# Patient Record
Sex: FEMALE | URBAN - METROPOLITAN AREA
[De-identification: names, ages, dates, MRNs, and addresses within clinical notes are randomized per-mention and may not be internally consistent; named-entity substitution may affect disease eponyms.]

---

## 2021-06-25 ENCOUNTER — OFFICE VISIT (OUTPATIENT)
Dept: URBAN - METROPOLITAN AREA CLINIC 91 | Facility: CLINIC | Age: 76
End: 2021-06-25
Payer: COMMERCIAL

## 2021-06-25 DIAGNOSIS — H04.123 DRY EYE SYNDROME OF BILATERAL LACRIMAL GLANDS: ICD-10-CM

## 2021-06-25 DIAGNOSIS — H43.811 VITREOUS DEGENERATION, RIGHT EYE: ICD-10-CM

## 2021-06-25 DIAGNOSIS — H16.223 KERATOCONJUNCTIVITIS SICCA, NOT SPECIFIED AS SJÖGREN'S, BILATERAL: ICD-10-CM

## 2021-06-25 PROCEDURE — 99213 OFFICE O/P EST LOW 20 MIN: CPT | Performed by: OPHTHALMOLOGY

## 2021-06-25 PROCEDURE — 92134 CPTRZ OPH DX IMG PST SGM RTA: CPT | Performed by: OPHTHALMOLOGY

## 2021-06-25 ASSESSMENT — INTRAOCULAR PRESSURE
OS: 16
OD: 16

## 2021-06-25 ASSESSMENT — VISUAL ACUITY
OD: 20/20
OS: 20/20

## 2021-06-25 NOTE — IMPRESSION/PLAN
Impression: Hypertensive retinopathy, bilateral: H35.033. Plan: Evidence of hypertensive retinopathy found. The patient was advised to see primary care physician to keep blood pressure under good control. Risk of sudden vision loss associated with hypertension was discussed with patient. If vision suddenly changes, patient should notify our office immediately. Donald STEIN.

## 2021-12-15 ENCOUNTER — OFFICE VISIT (OUTPATIENT)
Dept: URBAN - METROPOLITAN AREA CLINIC 91 | Facility: CLINIC | Age: 76
End: 2021-12-15
Payer: MEDICARE

## 2021-12-15 DIAGNOSIS — H35.033 HYPERTENSIVE RETINOPATHY, BILATERAL: Primary | ICD-10-CM

## 2021-12-15 PROCEDURE — 99213 OFFICE O/P EST LOW 20 MIN: CPT | Performed by: OPHTHALMOLOGY

## 2021-12-15 RX ORDER — CYCLOSPORINE 0.5 MG/ML
0.05 % EMULSION OPHTHALMIC
Qty: 180 | Refills: 3 | Status: ACTIVE
Start: 2021-12-15

## 2021-12-15 ASSESSMENT — INTRAOCULAR PRESSURE
OS: 13
OD: 13

## 2021-12-15 NOTE — IMPRESSION/PLAN
Impression: Hypertensive retinopathy, bilateral: H35.033. Plan: Evidence of hypertensive retinopathy found. The patient was advised to see primary care physician to keep blood pressure under good control. Risk of sudden vision loss associated with hypertension was discussed with patient. If vision suddenly changes, patient should notify our office immediately. FFm obtained and Reviewed WNL.

## 2022-05-13 ENCOUNTER — OFFICE VISIT (OUTPATIENT)
Dept: URBAN - METROPOLITAN AREA CLINIC 91 | Facility: CLINIC | Age: 77
End: 2022-05-13
Payer: MEDICARE

## 2022-05-13 DIAGNOSIS — H35.033 HYPERTENSIVE RETINOPATHY, BILATERAL: Primary | ICD-10-CM

## 2022-05-13 DIAGNOSIS — H10.45 OTHER CHRONIC ALLERGIC CONJUNCTIVITIS: ICD-10-CM

## 2022-05-13 PROCEDURE — 92134 CPTRZ OPH DX IMG PST SGM RTA: CPT | Performed by: OPHTHALMOLOGY

## 2022-05-13 PROCEDURE — 99213 OFFICE O/P EST LOW 20 MIN: CPT | Performed by: OPHTHALMOLOGY

## 2022-05-13 RX ORDER — ALCAFTADINE 2.5 MG/ML
0.25 % SOLUTION/ DROPS OPHTHALMIC
Qty: 5 | Refills: 2 | Status: ACTIVE
Start: 2022-05-13

## 2022-05-13 ASSESSMENT — INTRAOCULAR PRESSURE
OS: 16
OD: 16

## 2022-05-13 NOTE — IMPRESSION/PLAN
Impression: Other chronic allergic conjunctivitis: H10.45. Plan: For allergic conjunctivitis, I have recommended patient avoid allergens and use cool compresses for comfort. I also discussed option giving patient Rx for a topical antihistamine eye drop. Start Lastacaft BID OU.

## 2022-11-18 ENCOUNTER — OFFICE VISIT (OUTPATIENT)
Dept: URBAN - METROPOLITAN AREA CLINIC 91 | Facility: CLINIC | Age: 77
End: 2022-11-18
Payer: MEDICARE

## 2022-11-18 DIAGNOSIS — H04.123 DRY EYE SYNDROME OF BILATERAL LACRIMAL GLANDS: Primary | ICD-10-CM

## 2022-11-18 PROCEDURE — 99213 OFFICE O/P EST LOW 20 MIN: CPT | Performed by: OPHTHALMOLOGY

## 2022-11-18 ASSESSMENT — INTRAOCULAR PRESSURE
OS: 16
OD: 16

## 2023-06-06 ENCOUNTER — OFFICE VISIT (OUTPATIENT)
Dept: URBAN - METROPOLITAN AREA CLINIC 91 | Facility: CLINIC | Age: 78
End: 2023-06-06
Payer: MEDICARE

## 2023-06-06 DIAGNOSIS — H35.033 HYPERTENSIVE RETINOPATHY, BILATERAL: Primary | ICD-10-CM

## 2023-06-06 DIAGNOSIS — E11.9 TYPE 2 DIABETES MELLITUS WITHOUT COMPLICATIONS: ICD-10-CM

## 2023-06-06 PROCEDURE — 92134 CPTRZ OPH DX IMG PST SGM RTA: CPT | Performed by: OPHTHALMOLOGY

## 2023-06-06 PROCEDURE — 99214 OFFICE O/P EST MOD 30 MIN: CPT | Performed by: OPHTHALMOLOGY

## 2023-06-06 ASSESSMENT — INTRAOCULAR PRESSURE
OD: 14
OS: 16

## 2023-06-06 NOTE — IMPRESSION/PLAN
Impression: Type 2 diabetes mellitus without complications: E59.1. Plan: No evidence of diabetic retinopathy seen on today's exam in either eye. Ophthalmic pathologies associated with diabetes explained to patient. I have stressed the importance of patient maintaining good blood sugar control. I will continue to monitor patient's ocular status closely. Patient will return for follow-up appointment and repeat dilation.

## 2023-12-13 ENCOUNTER — OFFICE VISIT (OUTPATIENT)
Dept: URBAN - METROPOLITAN AREA CLINIC 91 | Facility: CLINIC | Age: 78
End: 2023-12-13
Payer: MEDICARE

## 2023-12-13 DIAGNOSIS — H16.223 KERATOCONJUNCTIVITIS SICCA, NOT SPECIFIED AS SJ”GREN'S, BILATERAL: Primary | ICD-10-CM

## 2023-12-13 PROCEDURE — 99213 OFFICE O/P EST LOW 20 MIN: CPT | Performed by: OPHTHALMOLOGY

## 2023-12-13 ASSESSMENT — INTRAOCULAR PRESSURE
OS: 15
OD: 14